# Patient Record
Sex: FEMALE | Race: WHITE | Employment: OTHER | URBAN - METROPOLITAN AREA
[De-identification: names, ages, dates, MRNs, and addresses within clinical notes are randomized per-mention and may not be internally consistent; named-entity substitution may affect disease eponyms.]

---

## 2023-11-29 ENCOUNTER — APPOINTMENT (EMERGENCY)
Dept: RADIOLOGY | Facility: HOSPITAL | Age: 58
End: 2023-11-29
Payer: COMMERCIAL

## 2023-11-29 ENCOUNTER — HOSPITAL ENCOUNTER (EMERGENCY)
Facility: HOSPITAL | Age: 58
Discharge: HOME | End: 2023-11-29
Attending: EMERGENCY MEDICINE
Payer: COMMERCIAL

## 2023-11-29 VITALS
HEIGHT: 60 IN | TEMPERATURE: 98.7 F | OXYGEN SATURATION: 98 % | HEART RATE: 95 BPM | WEIGHT: 175.93 LBS | SYSTOLIC BLOOD PRESSURE: 138 MMHG | DIASTOLIC BLOOD PRESSURE: 85 MMHG | BODY MASS INDEX: 34.54 KG/M2 | RESPIRATION RATE: 18 BRPM

## 2023-11-29 DIAGNOSIS — M25.532 PAIN AND SWELLING OF LEFT WRIST: Primary | ICD-10-CM

## 2023-11-29 DIAGNOSIS — M25.432 PAIN AND SWELLING OF LEFT WRIST: Primary | ICD-10-CM

## 2023-11-29 PROCEDURE — 73110 X-RAY EXAM OF WRIST: CPT

## 2023-11-29 PROCEDURE — 73130 X-RAY EXAM OF HAND: CPT

## 2023-11-29 PROCEDURE — 99283 EMERGENCY DEPT VISIT LOW MDM: CPT

## 2023-11-29 PROCEDURE — 99284 EMERGENCY DEPT VISIT MOD MDM: CPT | Performed by: PHYSICIAN ASSISTANT

## 2023-11-29 NOTE — DISCHARGE INSTRUCTIONS
Take Tylenol 650mg and ibuprofen 600mg every 6 hours as needed for pain. Apply ice to affected area. Wear wrist brace for comfort. Please follow-up with hand surgery.

## 2023-11-29 NOTE — ED PROVIDER NOTES
History  Chief Complaint   Patient presents with    Hand Pain     Patient reports lump on her left hand for three weeks. Patient reports hand and wrist pain, pins and needles. Patient has an appointment for  but was causing more pain today. 61yo right hand dominant female with a history of GERD presenting for left wrist pain x 3 weeks. She noticed a lump on the dorsal side of her left hand a few weeks ago which seems to be enlarging. She reports gradually increasing pain as well as paresthesias. No reported trauma. She has been using Tylenol without improvement. Of note, patient has a prior carpal tunnel release that was performed on the left hand 10 years ago. History provided by:  Patient   used: No        Prior to Admission Medications   Prescriptions Last Dose Informant Patient Reported? Taking?   dicyclomine (BENTYL) 20 mg tablet   No No   Sig: Take 1 tablet (20 mg total) by mouth 2 (two) times a day   omeprazole (PriLOSEC) 20 mg delayed release capsule   Yes No   Sig: TAKE 1 CAPSULE BY MOUTH EVERY DAY   ondansetron (ZOFRAN-ODT) 4 mg disintegrating tablet   No No   Sig: Take 1 tablet (4 mg total) by mouth every 6 (six) hours as needed for nausea or vomiting   ondansetron (Zofran ODT) 4 mg disintegrating tablet   No No   Sig: Take 1 tablet (4 mg total) by mouth every 6 (six) hours as needed for nausea or vomiting   phentermine (ADIPEX-P) 37.5 MG tablet   Yes No   Sig: Take 37.5 mg by mouth      Facility-Administered Medications: None       Past Medical History:   Diagnosis Date    GERD (gastroesophageal reflux disease)        Past Surgical History:   Procedure Laterality Date    ABDOMINAL SURGERY      CARPAL TUNNEL RELEASE       SECTION      ELBOW SURGERY      SHOULDER SURGERY         History reviewed. No pertinent family history. I have reviewed and agree with the history as documented.     E-Cigarette/Vaping     E-Cigarette/Vaping Substances     Social History Tobacco Use    Smoking status: Never    Smokeless tobacco: Never   Substance Use Topics    Alcohol use: Not Currently    Drug use: Never       Review of Systems   Constitutional:  Negative for chills and fever. Eyes:  Negative for discharge and redness. Musculoskeletal:  Positive for arthralgias. Negative for neck stiffness. Skin:  Negative for color change and rash. Neurological:  Positive for numbness. Negative for weakness. Psychiatric/Behavioral:  Negative for confusion. The patient is not nervous/anxious. All other systems reviewed and are negative. Physical Exam  Physical Exam  Vitals and nursing note reviewed. Constitutional:       General: She is not in acute distress. Appearance: Normal appearance. She is not toxic-appearing. HENT:      Head: Normocephalic and atraumatic. Right Ear: External ear normal.      Left Ear: External ear normal.   Eyes:      General: No scleral icterus. Right eye: No discharge. Left eye: No discharge. Conjunctiva/sclera: Conjunctivae normal.   Cardiovascular:      Rate and Rhythm: Normal rate. Pulmonary:      Effort: Pulmonary effort is normal. No respiratory distress. Breath sounds: No stridor. Musculoskeletal:         General: No deformity. Normal range of motion. Left wrist: Swelling and tenderness present. No deformity or effusion. Normal pulse. Cervical back: Normal range of motion. Comments: Left wrist: Small area of swelling to the dorsal aspect of the wrist with tenderness. No erythema or fluctuance. ROM decreased 2/2 pain. Motor and sensation intact in median, ulnar, and radial nerve distributions. 2+ radial pulse. Skin:     General: Skin is warm and dry. Neurological:      General: No focal deficit present. Mental Status: She is alert. Mental status is at baseline.    Psychiatric:         Mood and Affect: Mood normal.         Behavior: Behavior normal.         Vital Signs  ED Triage Vitals [11/29/23 1226]   Temperature Pulse Respirations Blood Pressure SpO2   98.7 °F (37.1 °C) 95 18 138/85 98 %      Temp Source Heart Rate Source Patient Position - Orthostatic VS BP Location FiO2 (%)   Oral -- Sitting Right arm --      Pain Score       7           Vitals:    11/29/23 1226   BP: 138/85   Pulse: 95   Patient Position - Orthostatic VS: Sitting         Visual Acuity      ED Medications  Medications - No data to display    Diagnostic Studies  Results Reviewed       None                   XR wrist 3+ views LEFT   ED Interpretation by Kishan Simms PA-C (11/29 1320)   No acute fracture      Final Result by Karuna Rivera MD (11/29 1544)      No acute osseous abnormality. Workstation performed: [id number]         XR hand 3+ views LEFT   ED Interpretation by Kishan Simms PA-C (11/29 1321)   No acute fracture      Final Result by Karuna Rivera MD (11/29 1543)      No acute osseous abnormality. Workstation performed: [id number]                    Procedures  Procedures         ED Course                       Medical Decision Making  [id number] presenting for atraumatic pain and swelling to L wrist x 3 weeks. Small area of swelling noted on exam which does not feel like a ganglion cyst. No signs of infection. LUE is neurovascularly intact. X-rays of hand and wrist obtained which appear normal. She was given a prefabricated wrist brace for comfort. Advised PRN ibuprofen and f/u with hand surgery. Problems Addressed:  Pain and swelling of left wrist: acute illness or injury    Amount and/or Complexity of Data Reviewed  Radiology: ordered and independent interpretation performed.              Disposition  Final diagnoses:   Pain and swelling of left wrist     Time reflects when diagnosis was documented in both MDM as applicable and the Disposition within this note       Time User Action Codes Description Comment    11/29/2023  1:21 PM 101[facility], M25.432] Pain and swelling of left wrist           ED Disposition       ED Disposition   Discharge    Condition   Stable    Date/Time   Wed Nov 29, 2023  1:21 PM    Comment   [facility] discharge to home/self care. Follow-up Information       Follow up With Specialties Details Why Contact Info Additional Information    Ian Olvera MD Orthopedic Surgery, Hand Surgery Schedule an appointment as soon as possible for a visit   [address],[place] [address] [facility] [address] [facility] Emergency Department Emergency Medicine  If symptoms worsen [address] [facility] [address] [facility] Emergency Department, [place], [place], [address]            Discharge Medication List as of 11/29/2023  1:22 PM        CONTINUE these medications which have NOT CHANGED    Details   dicyclomine (BENTYL) 20 mg tablet Take 1 tablet (20 mg total) by mouth 2 (two) times a day, Starting Tue 8/17/2021, Print      omeprazole (PriLOSEC) 20 mg delayed release capsule TAKE 1 CAPSULE BY MOUTH EVERY DAY, Historical Med      !! ondansetron (Zofran ODT) 4 mg disintegrating tablet Take 1 tablet (4 mg total) by mouth every 6 (six) hours as needed for nausea or vomiting, Starting Thu 12/8/2022, Print      !! ondansetron (ZOFRAN-ODT) 4 mg disintegrating tablet Take 1 tablet (4 mg total) by mouth every 6 (six) hours as needed for nausea or vomiting, Starting Tue 8/17/2021, Print      phentermine (ADIPEX-P) 37.5 MG tablet Take 37.5 mg by mouth, Starting Thu 6/3/2021, Until Wed 9/1/2021 at 2359, Historical Med       !! - Potential duplicate medications found. Please discuss with provider.               PDMP Review       None            ED Provider  Electronically Signed by             Vandana Thurston PA-C  11/30/23 1375

## 2023-12-01 ENCOUNTER — APPOINTMENT (OUTPATIENT)
Dept: RADIOLOGY | Facility: CLINIC | Age: 58
End: 2023-12-01
Payer: COMMERCIAL

## 2023-12-01 ENCOUNTER — OFFICE VISIT (OUTPATIENT)
Dept: OBGYN CLINIC | Facility: CLINIC | Age: 58
End: 2023-12-01
Payer: COMMERCIAL

## 2023-12-01 VITALS
OXYGEN SATURATION: 96 % | HEART RATE: 103 BPM | BODY MASS INDEX: 34.51 KG/M2 | SYSTOLIC BLOOD PRESSURE: 103 MMHG | WEIGHT: 175.8 LBS | DIASTOLIC BLOOD PRESSURE: 73 MMHG | HEIGHT: 60 IN | RESPIRATION RATE: 18 BRPM

## 2023-12-01 DIAGNOSIS — M25.532 PAIN AND SWELLING OF LEFT WRIST: ICD-10-CM

## 2023-12-01 DIAGNOSIS — M54.12 RADICULOPATHY, CERVICAL REGION: ICD-10-CM

## 2023-12-01 DIAGNOSIS — M25.522 PAIN IN LEFT ELBOW: Primary | ICD-10-CM

## 2023-12-01 DIAGNOSIS — M25.432 PAIN AND SWELLING OF LEFT WRIST: ICD-10-CM

## 2023-12-01 DIAGNOSIS — M25.522 PAIN IN LEFT ELBOW: ICD-10-CM

## 2023-12-01 PROCEDURE — 73080 X-RAY EXAM OF ELBOW: CPT

## 2023-12-01 PROCEDURE — 99204 OFFICE O/P NEW MOD 45 MIN: CPT | Performed by: ORTHOPAEDIC SURGERY

## 2023-12-01 PROCEDURE — 72050 X-RAY EXAM NECK SPINE 4/5VWS: CPT

## 2023-12-01 RX ORDER — PREDNISONE 20 MG/1
TABLET ORAL
Qty: 12 TABLET | Refills: 0 | Status: SHIPPED | OUTPATIENT
Start: 2023-12-01 | End: 2023-12-06

## 2023-12-01 RX ORDER — NAPROXEN 500 MG/1
500 TABLET ORAL 2 TIMES DAILY WITH MEALS
Qty: 21 TABLET | Refills: 0 | Status: SHIPPED | OUTPATIENT
Start: 2023-12-01

## 2023-12-01 NOTE — PROGRESS NOTES
HPI:  Patient is a 63y.o. year old RHD female who presents with chief complaint of left hand and wrist pain. She was treated in the ED 23 for a lump on her left hand for approximately 3 weeks. She was given a wrist brace and advised to follow up with hand surgery. Pain is managed with tylenol. She had prior left carpal tunnel release 10 years ago. She has had wrist pain for 3 weeks now. She has maintained her brace and notes it has been helping her. When she takes it off she feels pins and needs. She has pain shooting from her wrist to her elbow. She denies neck pain. She does not think her carpal tunnel surgery helped her.        ROS:   General: No fever, no chills, no weight loss, no weight gain  HEENT:  No loss of hearing, no nose bleeds, no sore throat  Eyes:  No eye pain, no red eyes, no visual disturbance  Respiratory:  No cough, no shortness of breath, no wheezing  Cardiovascular:  No chest pain, no palpitations, no edema  GI: No abdominal pain, no nausea, no vomiting  Endocrine: No frequent urination, no excessive thirst  Urinary:  No dysuria, no hematuria, no incontinence  Musculoskeletal: see HPI and PE  Skin:  No rash, no wounds  Neurological:  No dizziness, no headache, no numbness  Psychiatric:  No difficulty concentrating, no depression, no suicide thoughts, no anxiety  Review of all other systems is negative    PMH:  Past Medical History:   Diagnosis Date    GERD (gastroesophageal reflux disease)        PSH:  Past Surgical History:   Procedure Laterality Date    ABDOMINAL SURGERY      CARPAL TUNNEL RELEASE Bilateral      SECTION      ELBOW SURGERY Right     SHOULDER SURGERY Right        Medications:  Current Outpatient Medications   Medication Sig Dispense Refill    dicyclomine (BENTYL) 20 mg tablet Take 1 tablet (20 mg total) by mouth 2 (two) times a day (Patient taking differently: Take 20 mg by mouth 2 (two) times a day as needed) 20 tablet 0    naproxen (Naprosyn) 500 mg tablet Take 1 tablet (500 mg total) by mouth 2 (two) times a day with meals 21 tablet 0    omeprazole (PriLOSEC) 20 mg delayed release capsule TAKE 1 CAPSULE BY MOUTH EVERY DAY      ondansetron (Zofran ODT) 4 mg disintegrating tablet Take 1 tablet (4 mg total) by mouth every 6 (six) hours as needed for nausea or vomiting 20 tablet 0    predniSONE 20 mg tablet Take 1 tablet (20 mg total) by mouth 3 (three) times a day for 2 days, THEN 1 tablet (20 mg total) 2 (two) times a day for 2 days, THEN 1 tablet (20 mg total) daily for 2 days. 12 tablet 0    phentermine (ADIPEX-P) 37.5 MG tablet Take 37.5 mg by mouth       No current facility-administered medications for this visit. Allergies: Allergies   Allergen Reactions    Sulfa Antibiotics Hives       Family History:  Family History   Problem Relation Age of Onset    Diabetes Mother     Heart attack Father     Stomach cancer Maternal Grandmother        Social History:  Social History     Occupational History    Not on file   Tobacco Use    Smoking status: Never    Smokeless tobacco: Never   Vaping Use    Vaping Use: Never used   Substance and Sexual Activity    Alcohol use: Not Currently    Drug use: Never    Sexual activity: Yes     Partners: Male       Physical Exam:  General :  Alert, cooperative, no distress, appears stated age  Blood pressure 103/73, pulse 103, resp. rate 18, height 5' (1.524 m), weight 79.7 kg (175 lb 12.8 oz), SpO2 96 %. Head:  Normocephalic, without obvious abnormality, atraumatic   Eyes:  Conjunctiva/corneas clear, EOM's intact,   Ears: Both ears normal appearance, no hearing deficits.     Nose: Nares normal, septum midline, no drainage    Neck: Supple,  trachea midline, no adenopathy, no tenderness, no mass   Back:   Symmetric, no curvature, ROM normal, no tenderness   Lungs:   Respirations unlabored   Chest Wall:  No tenderness or deformity   Extremities: Extremities normal, atraumatic, no cyanosis or edema      Pulses: 2+ and symmetric   Skin: Skin color, texture, turgor normal, no rashes or lesions      Neurologic: Normal           Ortho Exam  Left Wrist    mild Tenderness to palpation over area of swelling in dorsal hand  Normal wrist and digit range of motion  Pain with resisted wrist flexion and extension  Full composite fist  Weak  strength  Neurovascularly intact distally    Cervical Spine:   Active range of motion normal.    There is no midline tenderness. There is on left paraspinal hypertonicity and tenderness. Sensation slightly dimished to light touch C5 through T1 dermatomes left upper extremity. Sensation intact to light touch C5 through T1 dermatomes right upper extremity. Left Motor: 4/5 biceps, 4/5 triceps, 4/5 wrist extension, 5/5 finger flexion, 5/5 finger abduction   Right Motor: 5/5 biceps, 5/5 triceps, 5/5 wrist extension, 5/5 finger flexion, 5/5 finger abduction   Reflexes 1+ and symmetric      Imaging Studies: The following imaging studies were reviewed in office today. My findings are noted. X-rays of the left wrist obtained 11/29/23 demonstrate no acute osseous abnormality. X-rays of the left hand obtained 11/29/23 demonstrate no acute osseous abnormality. X-rays of the left elbow obtained 12/1/23 demonstrate no acute osseous abnormality    X-rays of the cervical spine obtained 12/1/23 demonstrate retrolisthesis C5-6. Loss of lordosis and straightening. Assessment  Encounter Diagnoses   Name Primary? Pain and swelling of left wrist     Pain in left elbow Yes    Radiculopathy, cervical region          Plan:  63 y.o female with left arm pain, cervical radiculopathy  X-rays reviewed in office today  A referral was placed to outpatient physical therapy  A prendisone taper was sent to the patient's pharmacy on file  Naprosyn was sent to the patient's pharmacy on file.  Begin taking after finishing the prendisone   She will follow up in 4 weeks    Scribe Attestation      I,:  Gina Hubbard am acting as a scribe while in the presence of the attending physician.:       I,:  Tess Ahn MD personally performed the services described in this documentation    as scribed in my presence.:

## 2024-04-16 ENCOUNTER — TELEPHONE (OUTPATIENT)
Dept: GASTROENTEROLOGY | Facility: CLINIC | Age: 59
End: 2024-04-16

## 2024-04-16 NOTE — TELEPHONE ENCOUNTER
Pt is due for a 5 yr colon recall for surviellance. Hist of sessile polyp. Marco Antonio Avila pt. I lmom for pt to please call back to schedule a colonoscopy with one of our GI providers as Dr Avila is no longer with the network. Will call again if do not hear back from her.

## 2024-04-23 ENCOUNTER — PREP FOR PROCEDURE (OUTPATIENT)
Age: 59
End: 2024-04-23

## 2024-04-23 DIAGNOSIS — Z86.010 HISTORY OF COLON POLYPS: Primary | ICD-10-CM

## 2024-04-23 NOTE — TELEPHONE ENCOUNTER
04/23/24  Screened by: Karlee Chong    Referring Provider     Pre- Screening:     There is no height or weight on file to calculate BMI. 5  165 lbs  BMI 32.2  Has patient been referred for a routine screening Colonoscopy? yes  Is the patient between 45-75 years old? yes      Previous Colonoscopy yes   If yes:    Date: 2018    Facility:     Reason:     Does the patient want to see a Gastroenterologist prior to their procedure OR are they having any GI symptoms? yes    Has the patient been hospitalized or had abdominal surgery in the past 6 months? no    Does the patient use supplemental oxygen? no    Does the patient take Coumadin, Lovenox, Plavix, Elliquis, Xarelto, or other blood thinning medication? no    Has the patient had a stroke, cardiac event, or stent placed in the past year? no    If patient is between 45yrs - 49yrs, please advise patient that we will have to confirm benefits & coverage with their insurance company for a routine screening colonoscopy.

## 2024-04-23 NOTE — TELEPHONE ENCOUNTER
Scheduled date of colonoscopy (as of today): 6/3/24  Physician performing colonoscopy:   Location of colonoscopy: [place]  Bowel prep reviewed with patient: Miralax Dulcolax prep instructions reviewed and sent via email  Instructions reviewed with patient by: md  Clearances:

## 2024-04-24 ENCOUNTER — APPOINTMENT (EMERGENCY)
Dept: RADIOLOGY | Facility: HOSPITAL | Age: 59
End: 2024-04-24
Payer: COMMERCIAL

## 2024-04-24 ENCOUNTER — HOSPITAL ENCOUNTER (EMERGENCY)
Facility: HOSPITAL | Age: 59
Discharge: HOME | End: 2024-04-24
Attending: EMERGENCY MEDICINE
Payer: COMMERCIAL

## 2024-04-24 VITALS
TEMPERATURE: 97.6 F | RESPIRATION RATE: 18 BRPM | BODY MASS INDEX: 32.39 KG/M2 | DIASTOLIC BLOOD PRESSURE: 67 MMHG | WEIGHT: 165 LBS | HEART RATE: 72 BPM | OXYGEN SATURATION: 95 % | HEIGHT: 60 IN | SYSTOLIC BLOOD PRESSURE: 135 MMHG

## 2024-04-24 DIAGNOSIS — M54.6 ACUTE RIGHT-SIDED THORACIC BACK PAIN: Primary | ICD-10-CM

## 2024-04-24 PROCEDURE — 99284 EMERGENCY DEPT VISIT MOD MDM: CPT | Performed by: EMERGENCY MEDICINE

## 2024-04-24 PROCEDURE — 99283 EMERGENCY DEPT VISIT LOW MDM: CPT

## 2024-04-24 PROCEDURE — 96372 THER/PROPH/DIAG INJ SC/IM: CPT

## 2024-04-24 PROCEDURE — 72070 X-RAY EXAM THORAC SPINE 2VWS: CPT

## 2024-04-24 PROCEDURE — 71046 X-RAY EXAM CHEST 2 VIEWS: CPT

## 2024-04-24 RX ORDER — LIDOCAINE 50 MG/G
1 PATCH TOPICAL ONCE
Status: DISCONTINUED | OUTPATIENT
Start: 2024-04-24 | End: 2024-04-24 | Stop reason: HOSPADM

## 2024-04-24 RX ORDER — METHOCARBAMOL 500 MG/1
750 TABLET, FILM COATED ORAL ONCE
Status: COMPLETED | OUTPATIENT
Start: 2024-04-24 | End: 2024-04-24

## 2024-04-24 RX ORDER — METHOCARBAMOL 750 MG/1
750 TABLET, FILM COATED ORAL 2 TIMES DAILY PRN
Qty: 14 TABLET | Refills: 0 | Status: SHIPPED | OUTPATIENT
Start: 2024-04-24

## 2024-04-24 RX ORDER — KETOROLAC TROMETHAMINE 30 MG/ML
15 INJECTION, SOLUTION INTRAMUSCULAR; INTRAVENOUS ONCE
Status: COMPLETED | OUTPATIENT
Start: 2024-04-24 | End: 2024-04-24

## 2024-04-24 RX ORDER — ACETAMINOPHEN 325 MG/1
975 TABLET ORAL ONCE
Status: COMPLETED | OUTPATIENT
Start: 2024-04-24 | End: 2024-04-24

## 2024-04-24 RX ADMIN — METHOCARBAMOL 750 MG: 500 TABLET ORAL at 19:24

## 2024-04-24 RX ADMIN — KETOROLAC TROMETHAMINE 15 MG: 30 INJECTION, SOLUTION INTRAMUSCULAR; INTRAVENOUS at 19:24

## 2024-04-24 RX ADMIN — ACETAMINOPHEN 975 MG: 325 TABLET, FILM COATED ORAL at 19:24

## 2024-04-24 RX ADMIN — LIDOCAINE 5% 1 PATCH: 700 PATCH TOPICAL at 19:23

## 2024-04-24 NOTE — DISCHARGE INSTRUCTIONS
Do NOT drive or operate heavy machinery while taking muscle relaxers.    Return to the emergency department for any new or worsening symptoms.

## 2024-04-24 NOTE — ED PROVIDER NOTES
Pt Name: Marleny Calvin  MRN: [id number]  Birthdate 1965  Age/Sex: 58 y.o. female  Date of evaluation: 2024  PCP: Luis Alberto Herndon    CHIEF COMPLAINT    Chief Complaint   Patient presents with    Back Pain     Pt reports R mid back pain after lifting mother, worsening pain with inspiration. Denies numbness or tingling anywhere         HPI and MDM    58 y.o. female presenting with right sided mid back pain.  Patient states her mom is home at hospice, she lifted up this morning and felt like she pulled a muscle.  She took Naprosyn at home without relief.  Has not been doing any other lifting since then.  States the pain is reproducible with pressing on it and deep inspiration.  No chest pain, no shortness of breath, no nausea or vomiting, no numbness or tingling or weakness.      Differential diagnosis considered includes but not limited to muscular strain, pneumothorax, fracture, disc herniation.  Doubt PE or ACS.  Doubt acute aortic pathology.    Per my independent interpretation of chest x-ray, no acute rib fractures, no pneumothorax.    Per my independent interpretation of thoracic spine x-ray, normal alignment, no obvious step-offs.    Patient updated with results.  Concern primarily for muscular strain.  Provided positioning or muscle relaxers, standard precaution discussed, advised PCP follow-up, patient verbalized understanding and is in agreement with plan.          Medications   ketorolac (TORADOL) injection 15 mg (15 mg Intramuscular Given 24)   acetaminophen (TYLENOL) tablet 975 mg (975 mg Oral Given 24)   methocarbamol (ROBAXIN) tablet 750 mg (750 mg Oral Given 24)         Past Medical and Surgical History    Past Medical History:   Diagnosis Date    GERD (gastroesophageal reflux disease)        Past Surgical History:   Procedure Laterality Date    ABDOMINAL SURGERY      CARPAL TUNNEL RELEASE Bilateral      SECTION      ELBOW SURGERY Right     SHOULDER  SURGERY Right        Family History   Problem Relation Age of Onset    Diabetes Mother     Heart attack Father     Stomach cancer Maternal Grandmother        Social History     Tobacco Use    Smoking status: Never    Smokeless tobacco: Never   Vaping Use    Vaping status: Never Used   Substance Use Topics    Alcohol use: Not Currently    Drug use: Never           Allergies    Allergies   Allergen Reactions    Sulfa Antibiotics Hives       Home Medications    Prior to Admission medications    Medication Sig Start Date End Date Taking? Authorizing Provider   dicyclomine (BENTYL) 20 mg tablet Take 1 tablet (20 mg total) by mouth 2 (two) times a day  Patient taking differently: Take 20 mg by mouth 2 (two) times a day as needed 8/17/21   Ronal Mack MD   naproxen (Naprosyn) 500 mg tablet Take 1 tablet (500 mg total) by mouth 2 (two) times a day with meals 12/1/23   Avelina Fried MD   omeprazole (PriLOSEC) 20 mg delayed release capsule TAKE 1 CAPSULE BY MOUTH EVERY DAY 5/3/21   Historical Provider, MD   ondansetron (Zofran ODT) 4 mg disintegrating tablet Take 1 tablet (4 mg total) by mouth every 6 (six) hours as needed for nausea or vomiting 12/8/22   Demetra Barrientos DO   phentermine (ADIPEX-P) 37.5 MG tablet Take 37.5 mg by mouth 6/3/21 9/1/21  Historical Provider, MD           Physical Exam      ED Triage Vitals   Temperature Pulse Respirations Blood Pressure SpO2   04/24/24 1743 04/24/24 1743 04/24/24 1743 04/24/24 1743 04/24/24 1743   97.6 °F (36.4 °C) 72 18 135/67 95 %      Temp Source Heart Rate Source Patient Position - Orthostatic VS BP Location FiO2 (%)   04/24/24 1743 04/24/24 1743 04/24/24 1743 04/24/24 1743 --   Oral Monitor Sitting Left arm       Pain Score       04/24/24 1924       9               Physical Exam  Constitutional:       General: She is not in acute distress.     Appearance: She is not ill-appearing.   HENT:      Head: Normocephalic and atraumatic.      Nose: Nose normal.       Mouth/Throat:      Mouth: Mucous membranes are moist.   Eyes:      Extraocular Movements: Extraocular movements intact.      Pupils: Pupils are equal, round, and reactive to light.   Cardiovascular:      Rate and Rhythm: Normal rate and regular rhythm.   Pulmonary:      Effort: Pulmonary effort is normal. No respiratory distress.   Chest:      Chest wall: No tenderness.   Abdominal:      General: There is no distension.      Palpations: Abdomen is soft.   Musculoskeletal:         General: No swelling or deformity. Normal range of motion.      Cervical back: Normal range of motion and neck supple.      Comments: No midline spinal tenderness or step-offs, right thoracic paralumbar muscular tenderness to palpation just below the right scapula   Skin:     General: Skin is warm.      Findings: No erythema.   Neurological:      Mental Status: She is alert and oriented to person, place, and time. Mental status is at baseline.              Diagnostic Results      Labs:    Results Reviewed       None            All labs reviewed and utilized in the medical decision making process    Radiology:    XR chest 2 views    (Results Pending)   XR thoracic spine 2 views    (Results Pending)       All radiology studies independently viewed by me and interpreted by the radiologist.    Procedure    Procedures        FINAL IMPRESSION    Final diagnoses:   Acute right-sided thoracic back pain         DISPOSITION    Time reflects when diagnosis was documented in both MDM as applicable and the Disposition within this note       Time User Action Codes Description Comment    4/24/2024  7:52 PM Dharmesh Clifford Add [M54.6] Acute right-sided thoracic back pain           ED Disposition       ED Disposition   Discharge    Condition   Stable    Date/Time   Wed Apr 24, 2024  7:52 PM    Comment   Marleny Calvin discharge to home/self care.                   Follow-up Information       Follow up With Specialties Details Why Contact Info    Luis Alberto Hurst  [place] Nurse Practitioner Call in 1 day  [address] [facility] [address]  [phone]                PATIENT REFERRED TO:    Luis Alberto Hurst [place]  [address] [facility] [id number] [phone]    Call in 1 day        DISCHARGE MEDICATIONS:    Discharge Medication List as of 4/24/2024  7:54 PM        START taking these medications    Details   methocarbamol (ROBAXIN) 750 mg tablet Take 1 tablet (750 mg total) by mouth 2 (two) times a day as needed for muscle spasms, Starting Wed 4/24/2024, Normal           CONTINUE these medications which have NOT CHANGED    Details   dicyclomine (BENTYL) 20 mg tablet Take 1 tablet (20 mg total) by mouth 2 (two) times a day, Starting Tue 8/17/2021, Print      naproxen (Naprosyn) 500 mg tablet Take 1 tablet (500 mg total) by mouth 2 (two) times a day with meals, Starting Fri 12/1/2023, Normal      omeprazole (PriLOSEC) 20 mg delayed release capsule TAKE 1 CAPSULE BY MOUTH EVERY DAY, Historical Med      ondansetron (Zofran ODT) 4 mg disintegrating tablet Take 1 tablet (4 mg total) by mouth every 6 (six) hours as needed for nausea or vomiting, Starting Thu 12/8/2022, Print      phentermine (ADIPEX-P) 37.5 MG tablet Take 37.5 mg by mouth, Starting Thu 6/3/2021, Until Wed 9/1/2021 at 2359, Historical Med             No discharge procedures on file.         Dharmesh Clifford DO        This note was partially completed using voice recognition technology, and was scanned for gross errors; however some errors may still exist. Please contact the author with any questions or requests for clarification.      Dharmesh Clifford DO  04/25/24 0452

## 2024-05-31 ENCOUNTER — TELEPHONE (OUTPATIENT)
Age: 59
End: 2024-05-31

## 2024-05-31 NOTE — TELEPHONE ENCOUNTER
R/S pt sick    Scheduled date of EGD/colonoscopy (as of today): 06/21/2024  Physician performing EGD/colonoscopy: Dr. Cano  Location of EGD/colonoscopy: [place]  Desired bowel prep reviewed with patient: SAMANTHA/DUL  Prep instructions sent via email  Instructions reviewed with patient by: KOBI  Clearances: N/A

## 2024-06-21 ENCOUNTER — ANESTHESIA (OUTPATIENT)
Dept: GASTROENTEROLOGY | Facility: HOSPITAL | Age: 59
End: 2024-06-21

## 2024-06-21 ENCOUNTER — ANESTHESIA EVENT (OUTPATIENT)
Dept: GASTROENTEROLOGY | Facility: HOSPITAL | Age: 59
End: 2024-06-21

## 2024-06-21 ENCOUNTER — HOSPITAL ENCOUNTER (OUTPATIENT)
Dept: GASTROENTEROLOGY | Facility: HOSPITAL | Age: 59
Setting detail: OUTPATIENT SURGERY
End: 2024-06-21
Attending: INTERNAL MEDICINE
Payer: COMMERCIAL

## 2024-06-21 VITALS
RESPIRATION RATE: 18 BRPM | HEART RATE: 80 BPM | BODY MASS INDEX: 30.04 KG/M2 | OXYGEN SATURATION: 98 % | TEMPERATURE: 97.4 F | HEIGHT: 63 IN | DIASTOLIC BLOOD PRESSURE: 68 MMHG | SYSTOLIC BLOOD PRESSURE: 107 MMHG | WEIGHT: 169.53 LBS

## 2024-06-21 DIAGNOSIS — Z86.010 HISTORY OF COLON POLYPS: ICD-10-CM

## 2024-06-21 PROBLEM — D3A.00 CARCINOID TUMOR: Status: ACTIVE | Noted: 2023-09-19

## 2024-06-21 PROBLEM — C7A.8 PRIMARY MALIGNANT NEUROENDOCRINE TUMOR OF DUODENUM (HCC): Status: RESOLVED | Noted: 2023-10-27 | Resolved: 2024-06-21

## 2024-06-21 PROBLEM — D3A.00 CARCINOID TUMOR: Status: RESOLVED | Noted: 2023-09-19 | Resolved: 2024-06-21

## 2024-06-21 PROBLEM — C7A.8 PRIMARY MALIGNANT NEUROENDOCRINE TUMOR OF DUODENUM (HCC): Status: ACTIVE | Noted: 2023-10-27

## 2024-06-21 PROBLEM — J45.20 MILD INTERMITTENT ASTHMA WITHOUT COMPLICATION: Status: ACTIVE | Noted: 2018-08-06

## 2024-06-21 PROCEDURE — 45385 COLONOSCOPY W/LESION REMOVAL: CPT | Performed by: INTERNAL MEDICINE

## 2024-06-21 PROCEDURE — 88305 TISSUE EXAM BY PATHOLOGIST: CPT | Performed by: PATHOLOGY

## 2024-06-21 RX ORDER — PROPOFOL 10 MG/ML
INJECTION, EMULSION INTRAVENOUS AS NEEDED
Status: DISCONTINUED | OUTPATIENT
Start: 2024-06-21 | End: 2024-06-21

## 2024-06-21 RX ORDER — LIDOCAINE HYDROCHLORIDE 20 MG/ML
INJECTION, SOLUTION EPIDURAL; INFILTRATION; INTRACAUDAL; PERINEURAL AS NEEDED
Status: DISCONTINUED | OUTPATIENT
Start: 2024-06-21 | End: 2024-06-21

## 2024-06-21 RX ORDER — SODIUM CHLORIDE, SODIUM LACTATE, POTASSIUM CHLORIDE, CALCIUM CHLORIDE 600; 310; 30; 20 MG/100ML; MG/100ML; MG/100ML; MG/100ML
INJECTION, SOLUTION INTRAVENOUS CONTINUOUS PRN
Status: DISCONTINUED | OUTPATIENT
Start: 2024-06-21 | End: 2024-06-21

## 2024-06-21 RX ORDER — SODIUM CHLORIDE, SODIUM LACTATE, POTASSIUM CHLORIDE, CALCIUM CHLORIDE 600; 310; 30; 20 MG/100ML; MG/100ML; MG/100ML; MG/100ML
75 INJECTION, SOLUTION INTRAVENOUS CONTINUOUS
Status: DISCONTINUED | OUTPATIENT
Start: 2024-06-21 | End: 2024-06-25 | Stop reason: HOSPADM

## 2024-06-21 RX ADMIN — LIDOCAINE HYDROCHLORIDE 100 MG: 20 INJECTION, SOLUTION EPIDURAL; INFILTRATION; INTRACAUDAL; PERINEURAL at 14:41

## 2024-06-21 RX ADMIN — SODIUM CHLORIDE, SODIUM LACTATE, POTASSIUM CHLORIDE, AND CALCIUM CHLORIDE 75 ML/HR: .6; .31; .03; .02 INJECTION, SOLUTION INTRAVENOUS at 12:53

## 2024-06-21 RX ADMIN — SODIUM CHLORIDE, SODIUM LACTATE, POTASSIUM CHLORIDE, AND CALCIUM CHLORIDE: .6; .31; .03; .02 INJECTION, SOLUTION INTRAVENOUS at 14:02

## 2024-06-21 RX ADMIN — PROPOFOL 50 MG: 10 INJECTION, EMULSION INTRAVENOUS at 14:47

## 2024-06-21 RX ADMIN — PROPOFOL 50 MG: 10 INJECTION, EMULSION INTRAVENOUS at 14:43

## 2024-06-21 RX ADMIN — PROPOFOL 150 MG: 10 INJECTION, EMULSION INTRAVENOUS at 14:41

## 2024-06-21 NOTE — ANESTHESIA POSTPROCEDURE EVALUATION
Post-Op Assessment Note    CV Status:  Stable    Pain management: adequate       Mental Status:  Alert and awake   Hydration Status:  Euvolemic   PONV Controlled:  Controlled   Airway Patency:  Patent     Post Op Vitals Reviewed: Yes    No anethesia notable event occurred.    Staff: CRNA               BP   96/61   Temp 97.6   Pulse 80   Resp 19   SpO2 98

## 2024-06-21 NOTE — ANESTHESIA PREPROCEDURE EVALUATION
Procedure:  COLONOSCOPY    Relevant Problems   ANESTHESIA (within normal limits)      CARDIO (within normal limits)      ENDO (within normal limits)      GI/HEPATIC  NPO confirmed  BMI 30   (+) GERD (gastroesophageal reflux disease)      /RENAL (within normal limits)      HEMATOLOGY (within normal limits)      NEURO/PSYCH (within normal limits)      PULMONARY   (+) Mild intermittent asthma without complication   (-) URI (upper respiratory infection)     Allergies   Allergen Reactions    Sulfa Antibiotics Hives     Social History     Tobacco Use    Smoking status: Never    Smokeless tobacco: Never   Vaping Use    Vaping status: Never Used   Substance Use Topics    Alcohol use: Not Currently     Comment: ocassional    Drug use: Never     Current Outpatient Medications   Medication Instructions    albuterol (PROVENTIL HFA,VENTOLIN HFA) 90 mcg/act inhaler INHALE 2 PUFFS EVERY 6 HOURS AS NEEDED FOR WHEEZING    dicyclomine (BENTYL) 20 mg, Oral, 2 times daily    methocarbamol (ROBAXIN) 750 mg, Oral, 2 times daily PRN    naproxen (NAPROSYN) 500 mg, Oral, 2 times daily with meals    omeprazole (PriLOSEC) 20 mg delayed release capsule TAKE 1 CAPSULE BY MOUTH EVERY DAY    ondansetron (ZOFRAN ODT) 4 mg, Oral, Every 6 hours PRN    phentermine (ADIPEX-P) 37.5 mg    zolpidem (AMBIEN) 10 mg, Oral, Daily at bedtime PRN     Lab Results   Component Value Date    WBC 9.16 12/08/2022    HGB 14.2 12/08/2022    HCT 43.8 12/08/2022     12/08/2022    SODIUM 138 05/23/2024    K 4.4 05/23/2024     05/23/2024    CO2 27 05/23/2024    BUN 12 05/23/2024    CREATININE 0.75 05/23/2024    GLUC 102 (H) 05/23/2024    HGBA1C 6.4 (H) 09/23/2023    AST 27 05/23/2024    ALT 24 05/23/2024    ALKPHOS 94 05/23/2024    TBILI 0.3 05/23/2024    ALB 4.0 05/23/2024     Vitals:    06/21/24 1238   BP: 103/76   Pulse: 85   Resp: 18   Temp: 97.7 °F (36.5 °C)   SpO2: 96%     EKG 12/8/22  Sinus tachycardia  Confirmed by Ponnatrosalieur, Meghan ([id number]) on  12/8/2022 1:31:45 PM     Physical Exam    Airway    Mallampati score: II  TM Distance: >3 FB  Neck ROM: full     Dental   Comment: Denies loose/chipped teeth, No notable dental hx     Cardiovascular  Rhythm: regular, Rate: normal, Cardiovascular exam normal    Pulmonary  Pulmonary exam normal Breath sounds clear to auscultation    Other Findings  post-pubertal.      Anesthesia Plan  ASA Score- 2     Anesthesia Type- IV sedation with anesthesia with ASA Monitors.         Additional Monitors:     Airway Plan:     Comment: O2 mask, natural airway, EtCO2 monitor. Risks discussed including awareness, aspiration, drug reactions and conversion to GA..       Plan Factors-Exercise tolerance (METS): >4 METS.    Chart reviewed. EKG reviewed.  Existing labs reviewed. Patient summary reviewed.    Patient is not a current smoker.              Induction- intravenous.    Postoperative Plan-     Perioperative Resuscitation Plan - Level 1 - Full Code.       Informed Consent- Anesthetic plan and risks discussed with patient.  I personally reviewed this patient with the CRNA. Discussed and agreed on the Anesthesia Plan with the CRNA..

## 2024-06-21 NOTE — H&P
"History and Physical -  [facility]  Marleny Calvin 58 y.o. female MRN: [id number]                  HPI: Marleny Calvin is a 58 y.o. year old female who presents for colonoscopy for crc screening.      REVIEW OF SYSTEMS: Per the HPI, and otherwise unremarkable.    Historical Information   Past Medical History:   Diagnosis Date    GERD (gastroesophageal reflux disease)      Past Surgical History:   Procedure Laterality Date    ABDOMINAL SURGERY      tiny cyst removal as per pt    CARPAL TUNNEL RELEASE Bilateral      SECTION      ELBOW SURGERY Right     SHOULDER SURGERY Right      Social History   Social History     Substance and Sexual Activity   Alcohol Use Not Currently    Comment: ocassional     Social History     Substance and Sexual Activity   Drug Use Never     Social History     Tobacco Use   Smoking Status Never   Smokeless Tobacco Never     Family History   Problem Relation Age of Onset    Diabetes Mother     Heart attack Father     Stomach cancer Maternal Grandmother        Meds/Allergies       Current Outpatient Medications:     omeprazole (PriLOSEC) 20 mg delayed release capsule    zolpidem (AMBIEN) 10 mg tablet    albuterol (PROVENTIL HFA,VENTOLIN HFA) 90 mcg/act inhaler    dicyclomine (BENTYL) 20 mg tablet    methocarbamol (ROBAXIN) 750 mg tablet    naproxen (Naprosyn) 500 mg tablet    ondansetron (Zofran ODT) 4 mg disintegrating tablet    phentermine (ADIPEX-P) 37.5 MG tablet    Current Facility-Administered Medications:     lactated ringers infusion, 75 mL/hr, Intravenous, Continuous, 75 mL/hr at 24 1253    Allergies   Allergen Reactions    Sulfa Antibiotics Hives       Objective     /76   Pulse 85   Temp 97.7 °F (36.5 °C) (Temporal)   Resp 18   Ht 5' 3\" (1.6 m)   Wt 76.9 kg (169 lb 8.5 oz)   SpO2 96%   BMI 30.03 kg/m²       PHYSICAL EXAM    Gen: NAD  Head: NCAT  CV: RRR  CHEST: Clear  ABD: soft, NT/ND  EXT: no edema      ASSESSMENT/PLAN:  Marleny Calvin is " a 58 y.o. year old female who presents for colonoscopy for crc screening. The patient is stable and optimized for the procedure, we reviewed risk and benefits. Risk include but not limited to infection, bleeding, perforation and missing a lesion.

## 2024-06-25 PROCEDURE — 88305 TISSUE EXAM BY PATHOLOGIST: CPT | Performed by: PATHOLOGY

## 2024-06-26 ENCOUNTER — TELEPHONE (OUTPATIENT)
Age: 59
End: 2024-06-26

## 2024-06-26 NOTE — TELEPHONE ENCOUNTER
----- Message from Alison Cano MD sent at 6/25/2024  4:47 PM EDT -----  Hi can we let pt know about precancerous polyp removed. Repeat colon in 3 years.

## 2025-01-01 ENCOUNTER — HOSPITAL ENCOUNTER (EMERGENCY)
Facility: HOSPITAL | Age: 60
Discharge: HOME | End: 2025-01-01
Attending: EMERGENCY MEDICINE
Payer: MEDICARE

## 2025-01-01 ENCOUNTER — APPOINTMENT (EMERGENCY)
Dept: RADIOLOGY | Facility: HOSPITAL | Age: 60
End: 2025-01-01
Payer: MEDICARE

## 2025-01-01 VITALS
WEIGHT: 172 LBS | OXYGEN SATURATION: 98 % | DIASTOLIC BLOOD PRESSURE: 81 MMHG | TEMPERATURE: 98.7 F | RESPIRATION RATE: 18 BRPM | HEART RATE: 109 BPM | BODY MASS INDEX: 30.48 KG/M2 | HEIGHT: 63 IN | SYSTOLIC BLOOD PRESSURE: 139 MMHG

## 2025-01-01 DIAGNOSIS — J45.901 ASTHMA EXACERBATION: ICD-10-CM

## 2025-01-01 DIAGNOSIS — J11.1 INFLUENZA: Primary | ICD-10-CM

## 2025-01-01 LAB
FLUAV RNA RESP QL NAA+PROBE: POSITIVE
FLUBV RNA RESP QL NAA+PROBE: NEGATIVE
RSV RNA RESP QL NAA+PROBE: NEGATIVE
SARS-COV-2 RNA RESP QL NAA+PROBE: NEGATIVE

## 2025-01-01 PROCEDURE — 99283 EMERGENCY DEPT VISIT LOW MDM: CPT

## 2025-01-01 PROCEDURE — 94640 AIRWAY INHALATION TREATMENT: CPT

## 2025-01-01 PROCEDURE — 71046 X-RAY EXAM CHEST 2 VIEWS: CPT

## 2025-01-01 PROCEDURE — 0241U HB NFCT DS VIR RESP RNA 4 TRGT: CPT | Performed by: EMERGENCY MEDICINE

## 2025-01-01 PROCEDURE — 99284 EMERGENCY DEPT VISIT MOD MDM: CPT | Performed by: EMERGENCY MEDICINE

## 2025-01-01 RX ORDER — PREDNISONE 20 MG/1
60 TABLET ORAL DAILY
Qty: 12 TABLET | Refills: 0 | Status: SHIPPED | OUTPATIENT
Start: 2025-01-01 | End: 2025-01-05

## 2025-01-01 RX ORDER — ALBUTEROL SULFATE 0.83 MG/ML
5 SOLUTION RESPIRATORY (INHALATION) ONCE
Status: COMPLETED | OUTPATIENT
Start: 2025-01-01 | End: 2025-01-01

## 2025-01-01 RX ORDER — PREDNISONE 20 MG/1
60 TABLET ORAL ONCE
Status: COMPLETED | OUTPATIENT
Start: 2025-01-01 | End: 2025-01-01

## 2025-01-01 RX ADMIN — ALBUTEROL SULFATE 5 MG: 2.5 SOLUTION RESPIRATORY (INHALATION) at 10:48

## 2025-01-01 RX ADMIN — PREDNISONE 60 MG: 20 TABLET ORAL at 10:40

## 2025-01-01 RX ADMIN — IPRATROPIUM BROMIDE 0.5 MG: 0.5 SOLUTION RESPIRATORY (INHALATION) at 10:48

## 2025-01-01 NOTE — ED PROVIDER NOTES
Time reflects when diagnosis was documented in both MDM as applicable and the Disposition within this note       Time User Action Codes Description Comment    2025 12:10 PM Rochelle Arcos Add [J11.1] Influenza     2025 12:11 PM Rochelle Arcos Add [J45.901] Asthma exacerbation           ED Disposition       ED Disposition   Discharge    Condition   Stable    Date/Time    12:10 PM    Comment   Marleny Calvin discharge to home/self care.                   Assessment & Plan       Medical Decision Making  60 y/o female with flu like symptoms- [place] lget covid/flu/rsv and cxr. Will give breathing tx and steroids. Will reassess for dispo     Amount and/or Complexity of Data Reviewed  Labs:  Decision-making details documented in ED Course.  Radiology: ordered.    Risk  Prescription drug management.        ED Course as of 25 1501      1127 INFLU A PCR(!): Positive       Medications   albuterol inhalation solution 5 mg (5 mg Nebulization Given 25 1048)   ipratropium (ATROVENT) 0.02 % inhalation solution 0.5 mg (0.5 mg Nebulization Given 25 1048)   predniSONE tablet 60 mg (60 mg Oral Given 25 1040)       ED Risk Strat Scores                                              History of Present Illness       Chief Complaint   Patient presents with    Flu Symptoms     C/o congestion, body aches, cough and fevers x 1week.        Past Medical History:   Diagnosis Date    GERD (gastroesophageal reflux disease)       Past Surgical History:   Procedure Laterality Date    ABDOMINAL SURGERY      tiny cyst removal as per pt    CARPAL TUNNEL RELEASE Bilateral      SECTION      ELBOW SURGERY Right     SHOULDER SURGERY Right       Family History   Problem Relation Age of Onset    Diabetes Mother     Heart attack Father     Stomach cancer Maternal Grandmother       Social History     Tobacco Use    Smoking status: Never    Smokeless tobacco: Never   Vaping Use    Vaping status:  Never Used   Substance Use Topics    Alcohol use: Not Currently     Comment: ocassional    Drug use: Never      E-Cigarette/Vaping    E-Cigarette Use Never User       E-Cigarette/Vaping Substances      I have reviewed and agree with the history as documented.     58 y/o female presents to the ED for flu like symptoms x 5 days. She states that she has had body aches, congestion, cough, and fever. +sick contacts. She denies any abd pain, n/v, d/c, or urianry symptoms. States that she was recently exposed to RSV. Has a hx of asthma and has needed to use her inhaler more often at home. No recent steroids. No other complaints.       History provided by:  Patient  Flu Symptoms  Presenting symptoms: cough, fever and myalgias    Presenting symptoms: no diarrhea, no headaches, no nausea, no shortness of breath, no sore throat and no vomiting    Severity:  Moderate  Onset quality:  Sudden  Progression:  Worsening  Chronicity:  New  Relieved by:  None tried  Worsened by:  Nothing  Ineffective treatments:  None tried  Associated symptoms: chills and nasal congestion    Associated symptoms: no ear pain and no neck stiffness    Risk factors: sick contacts        Review of Systems   Constitutional:  Positive for chills and fever.   HENT:  Positive for congestion. Negative for ear pain and sore throat.    Eyes:  Negative for pain and visual disturbance.   Respiratory:  Positive for cough. Negative for shortness of breath and wheezing.    Cardiovascular:  Negative for chest pain and leg swelling.   Gastrointestinal:  Negative for abdominal pain, diarrhea, nausea and vomiting.   Genitourinary:  Negative for dysuria, frequency, hematuria and urgency.   Musculoskeletal:  Positive for myalgias. Negative for neck pain and neck stiffness.   Skin:  Negative for rash and wound.   Neurological:  Negative for weakness, numbness and headaches.   Psychiatric/Behavioral:  Negative for agitation and confusion.    All other systems reviewed and  are negative.          Objective       ED Triage Vitals [01/01/25 0958]   Temperature Pulse Blood Pressure Respirations SpO2 Patient Position - Orthostatic VS   98.7 °F (37.1 °C) (!) 109 139/81 18 98 % Sitting      Temp Source Heart Rate Source BP Location FiO2 (%) Pain Score    Temporal Monitor Left arm -- --      Vitals      Date and Time Temp Pulse SpO2 Resp BP Pain Score FACES Pain Rating User   01/01/25 0958 98.7 °F (37.1 °C) 109 98 % 18 139/81 -- -- KMR            Physical Exam  Vitals and nursing note reviewed.   Constitutional:       Appearance: She is well-developed.   HENT:      Head: Normocephalic and atraumatic.   Eyes:      Pupils: Pupils are equal, round, and reactive to light.   Cardiovascular:      Rate and Rhythm: Normal rate and regular rhythm.   Pulmonary:      Effort: Pulmonary effort is normal.      Breath sounds: Normal breath sounds.   Abdominal:      General: Bowel sounds are normal.      Palpations: Abdomen is soft.   Musculoskeletal:         General: Normal range of motion.      Cervical back: Normal range of motion and neck supple.   Skin:     General: Skin is warm and dry.   Neurological:      General: No focal deficit present.      Mental Status: She is alert and oriented to person, place, and time.      Comments: No focal deficits         Results Reviewed       Procedure Component Value Units Date/Time    FLU/RSV/COVID - if FLU/RSV clinically relevant (2hr TAT) [[id number]]  (Abnormal) Collected: 01/01/25 1040    Lab Status: Final result Specimen: Nares from Nose Updated: 01/01/25 1124     SARS-CoV-2 Negative     INFLUENZA A PCR Positive     INFLUENZA B PCR Negative     RSV PCR Negative    Narrative:      This test has been performed using the CoV-2/Flu/RSV plus assay on the [employer] GeneXpert platform. This test has been validated by the  and verified by the performing laboratory.     This test is designed to amplify and detect the following: nucleocapsid (N), envelope (E),  and RNA-dependent RNA polymerase (RdRP) genes of the SARS-CoV-2 genome; matrix (M), basic polymerase (PB2), and acidic protein (PA) segments of the influenza A genome; matrix (M) and non-structural protein (NS) segments of the influenza B genome, and the nucleocapsid genes of RSV A and RSV B.     Positive results are indicative of the presence of Flu A, Flu B, RSV, and/or SARS-CoV-2 RNA. Positive results for SARS-CoV-2 or suspected novel influenza should be reported to state, local, or federal health departments according to local reporting requirements.      All results should be assessed in conjunction with clinical presentation and other laboratory markers for clinical management.     FOR PEDIATRIC PATIENTS - copy/paste COVID Guidelines URL to browser: [email or web address]               XR chest 2 views    (Results Pending)       Procedures    ED Medication and Procedure Management   Prior to Admission Medications   Prescriptions Last Dose Informant Patient Reported? Taking?   albuterol (PROVENTIL HFA,VENTOLIN HFA) 90 mcg/act inhaler   Yes No   Sig: INHALE 2 PUFFS EVERY 6 HOURS AS NEEDED FOR WHEEZING   dicyclomine (BENTYL) 20 mg tablet  Self No No   Sig: Take 1 tablet (20 mg total) by mouth 2 (two) times a day   Patient taking differently: Take 20 mg by mouth 2 (two) times a day as needed   methocarbamol (ROBAXIN) 750 mg tablet   No No   Sig: Take 1 tablet (750 mg total) by mouth 2 (two) times a day as needed for muscle spasms   Patient not taking: Reported on 6/10/2024   naproxen (Naprosyn) 500 mg tablet   No No   Sig: Take 1 tablet (500 mg total) by mouth 2 (two) times a day with meals   Patient not taking: Reported on 6/10/2024   omeprazole (PriLOSEC) 20 mg delayed release capsule  Self Yes No   Sig: TAKE 1 CAPSULE BY MOUTH EVERY DAY   ondansetron (Zofran ODT) 4 mg disintegrating tablet  Self No No   Sig: Take 1 tablet (4 mg total) by mouth every 6 (six)  hours as needed for nausea or vomiting   Patient not taking: Reported on 6/10/2024   phentermine (ADIPEX-P) 37.5 MG tablet   Yes No   Sig: Take 37.5 mg by mouth   Patient not taking: Reported on 6/10/2024   zolpidem (AMBIEN) 10 mg tablet   Yes No   Sig: Take 10 mg by mouth daily at bedtime as needed      Facility-Administered Medications: None     Discharge Medication List as of 1/1/2025 12:11 PM        START taking these medications    Details   predniSONE 20 mg tablet Take 3 tablets (60 mg total) by mouth daily for 4 days, Starting Wed 1/1/2025, Until Sun 1/5/2025, Normal           CONTINUE these medications which have NOT CHANGED    Details   albuterol (PROVENTIL HFA,VENTOLIN HFA) 90 mcg/act inhaler INHALE 2 PUFFS EVERY 6 HOURS AS NEEDED FOR WHEEZING, Historical Med      dicyclomine (BENTYL) 20 mg tablet Take 1 tablet (20 mg total) by mouth 2 (two) times a day, Starting Tue 8/17/2021, Print      methocarbamol (ROBAXIN) 750 mg tablet Take 1 tablet (750 mg total) by mouth 2 (two) times a day as needed for muscle spasms, Starting Wed 4/24/2024, Normal      naproxen (Naprosyn) 500 mg tablet Take 1 tablet (500 mg total) by mouth 2 (two) times a day with meals, Starting Fri 12/1/2023, Normal      omeprazole (PriLOSEC) 20 mg delayed release capsule TAKE 1 CAPSULE BY MOUTH EVERY DAY, Historical Med      ondansetron (Zofran ODT) 4 mg disintegrating tablet Take 1 tablet (4 mg total) by mouth every 6 (six) hours as needed for nausea or vomiting, Starting Thu 12/8/2022, Print      phentermine (ADIPEX-P) 37.5 MG tablet Take 37.5 mg by mouth, Starting Thu 6/3/2021, Until Wed 9/1/2021 at 2359, Historical Med      zolpidem (AMBIEN) 10 mg tablet Take 10 mg by mouth daily at bedtime as needed, Starting Thu 3/21/2024, Historical Med           No discharge procedures on file.  ED SEPSIS DOCUMENTATION   Time reflects when diagnosis was documented in both MDM as applicable and the Disposition within this note       Time User Action  Codes Description Comment    1/1/2025 12:10 PM Rochelle Arcos [J11.1] Influenza     1/1/2025 12:11 PM Rochelle Arcos Add [J45.901] Asthma exacerbation                  Rochelle Arcos, DO  01/01/25 1501